# Patient Record
Sex: MALE | Race: WHITE
[De-identification: names, ages, dates, MRNs, and addresses within clinical notes are randomized per-mention and may not be internally consistent; named-entity substitution may affect disease eponyms.]

---

## 2018-01-02 ENCOUNTER — HOSPITAL ENCOUNTER (OUTPATIENT)
Dept: HOSPITAL 53 - M LRY | Age: 58
End: 2018-01-02
Attending: NURSE PRACTITIONER
Payer: COMMERCIAL

## 2018-01-02 DIAGNOSIS — M25.531: ICD-10-CM

## 2018-01-02 DIAGNOSIS — M19.041: ICD-10-CM

## 2018-01-02 DIAGNOSIS — M25.721: Primary | ICD-10-CM

## 2018-01-02 DIAGNOSIS — R60.0: ICD-10-CM

## 2018-01-02 DIAGNOSIS — M25.511: ICD-10-CM

## 2018-01-02 PROCEDURE — 73030 X-RAY EXAM OF SHOULDER: CPT

## 2018-05-13 ENCOUNTER — HOSPITAL ENCOUNTER (OUTPATIENT)
Dept: HOSPITAL 53 - M LRY | Age: 58
End: 2018-05-13
Attending: NURSE PRACTITIONER
Payer: COMMERCIAL
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
## 2018-05-13 DIAGNOSIS — S41.112A: Primary | ICD-10-CM
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
## 2018-05-13 PROCEDURE — 73090 X-RAY EXAM OF FOREARM: CPT

## 2018-06-25 ENCOUNTER — HOSPITAL ENCOUNTER (OUTPATIENT)
Dept: HOSPITAL 53 - M OPP | Age: 58
Discharge: HOME | End: 2018-06-25
Attending: INTERNAL MEDICINE
Payer: COMMERCIAL

## 2018-06-25 DIAGNOSIS — K22.70: ICD-10-CM

## 2018-06-25 DIAGNOSIS — K21.9: ICD-10-CM

## 2018-06-25 DIAGNOSIS — K44.9: ICD-10-CM

## 2018-06-25 DIAGNOSIS — M54.5: ICD-10-CM

## 2018-06-25 DIAGNOSIS — E78.00: ICD-10-CM

## 2018-06-25 DIAGNOSIS — G47.30: ICD-10-CM

## 2018-06-25 DIAGNOSIS — R12: ICD-10-CM

## 2018-06-25 DIAGNOSIS — Z80.3: ICD-10-CM

## 2018-06-25 DIAGNOSIS — N40.1: ICD-10-CM

## 2018-06-25 DIAGNOSIS — F41.1: ICD-10-CM

## 2018-06-25 DIAGNOSIS — Z79.899: ICD-10-CM

## 2018-06-25 DIAGNOSIS — Z91.89: ICD-10-CM

## 2018-06-25 DIAGNOSIS — K22.8: Primary | ICD-10-CM

## 2018-06-25 PROCEDURE — 43239 EGD BIOPSY SINGLE/MULTIPLE: CPT

## 2018-06-25 RX ADMIN — SODIUM CHLORIDE 1 MLS/HR: 9 INJECTION, SOLUTION INTRAVENOUS at 08:00

## 2018-10-01 ENCOUNTER — HOSPITAL ENCOUNTER (EMERGENCY)
Dept: HOSPITAL 53 - M ED | Age: 58
Discharge: LEFT BEFORE DISCHARGE | End: 2018-10-01
Payer: COMMERCIAL

## 2018-10-01 DIAGNOSIS — N40.0: ICD-10-CM

## 2018-10-01 DIAGNOSIS — Z53.21: ICD-10-CM

## 2018-10-01 DIAGNOSIS — Z91.013: ICD-10-CM

## 2018-10-01 DIAGNOSIS — K21.9: ICD-10-CM

## 2018-10-01 DIAGNOSIS — Z79.899: ICD-10-CM

## 2018-10-01 DIAGNOSIS — I10: ICD-10-CM

## 2018-10-01 DIAGNOSIS — I45.10: ICD-10-CM

## 2018-10-01 DIAGNOSIS — R42: Primary | ICD-10-CM

## 2018-10-01 LAB
ALBUMIN/GLOBULIN RATIO: 1.26 (ref 1–1.93)
ALBUMIN: 4.3 GM/DL (ref 3.2–5.2)
ALKALINE PHOSPHATASE: 79 U/L (ref 45–117)
ALT SERPL W P-5'-P-CCNC: 37 U/L (ref 12–78)
ANION GAP: 7 MEQ/L (ref 8–16)
AST SERPL-CCNC: 21 U/L (ref 7–37)
BASO #: 0 10^3/UL (ref 0–0.2)
BASO %: 0.4 % (ref 0–1)
BILIRUB CONJ SERPL-MCNC: 0.1 MG/DL (ref 0–0.2)
BILIRUBIN,TOTAL: 0.3 MG/DL (ref 0.2–1)
BLOOD UREA NITROGEN: 11 MG/DL (ref 7–18)
CALCIUM LEVEL: 9.6 MG/DL (ref 8.5–10.1)
CARBON DIOXIDE LEVEL: 25 MEQ/L (ref 21–32)
CHLORIDE LEVEL: 109 MEQ/L (ref 98–107)
CK MB CFR.DF SERPL CALC: 0.89
CK SERPL-CCNC: 293 U/L (ref 39–308)
CK-MB VALUE MASS: 3 NG/ML (ref ?–3.6)
CREATININE FOR GFR: 1 MG/DL (ref 0.7–1.3)
EOS #: 0.1 10^3/UL (ref 0–0.5)
EOSINOPHIL NFR BLD AUTO: 1.5 % (ref 0–3)
GFR SERPL CREATININE-BSD FRML MDRD: > 60 ML/MIN/{1.73_M2} (ref 56–?)
GLUCOSE, FASTING: 88 MG/DL (ref 70–100)
HEMATOCRIT: 41.9 % (ref 42–52)
HEMOGLOBIN: 14.6 G/DL (ref 13.5–17.5)
IMMATURE GRANULOCYTE %: 0.3 % (ref 0–3)
LYMPH #: 2.1 10^3/UL (ref 1.5–4.5)
LYMPH %: 29.7 % (ref 24–44)
MEAN CORPUSCULAR HEMOGLOBIN: 32.2 PG (ref 27–33)
MEAN CORPUSCULAR HGB CONC: 34.8 G/DL (ref 32–36.5)
MEAN CORPUSCULAR VOLUME: 92.5 FL (ref 80–96)
MONO #: 0.6 10^3/UL (ref 0–0.8)
MONO %: 8.3 % (ref 0–5)
NEUTROPHILS #: 4.3 10^3/UL (ref 1.8–7.7)
NEUTROPHILS %: 59.8 % (ref 36–66)
NRBC BLD AUTO-RTO: 0 % (ref 0–0)
PLATELET COUNT, AUTOMATED: 221 10^3/UL (ref 150–450)
POTASSIUM SERUM: 4 MEQ/L (ref 3.5–5.1)
RED BLOOD COUNT: 4.53 10^6/UL (ref 4.3–6.1)
RED CELL DISTRIBUTION WIDTH: 12.6 % (ref 11.5–14.5)
SODIUM LEVEL: 141 MEQ/L (ref 136–145)
THYROID STIMULATING HORMONE: 1 UIU/ML (ref 0.36–3.74)
TOTAL PROTEIN: 7.7 GM/DL (ref 6.4–8.2)
TROPONIN I: < 0.02 NG/ML (ref ?–0.1)
WHITE BLOOD COUNT: 7.2 10^3/UL (ref 4–10)

## 2018-10-01 PROCEDURE — 70450 CT HEAD/BRAIN W/O DYE: CPT

## 2018-10-01 RX ADMIN — MECLIZINE HYDROCHLORIDE 1 MG: 25 TABLET ORAL at 14:59

## 2018-11-02 ENCOUNTER — HOSPITAL ENCOUNTER (OUTPATIENT)
Dept: HOSPITAL 53 - M RAD | Age: 58
End: 2018-11-02
Attending: INTERNAL MEDICINE
Payer: COMMERCIAL

## 2018-11-02 DIAGNOSIS — I10: Primary | ICD-10-CM

## 2019-08-06 ENCOUNTER — HOSPITAL ENCOUNTER (OUTPATIENT)
Dept: HOSPITAL 53 - M PAIN | Age: 59
End: 2019-08-06
Attending: ANESTHESIOLOGY
Payer: COMMERCIAL

## 2019-08-06 DIAGNOSIS — R29.90: Primary | ICD-10-CM

## 2019-08-06 DIAGNOSIS — J30.2: ICD-10-CM

## 2019-08-06 DIAGNOSIS — Z91.018: ICD-10-CM

## 2019-08-06 DIAGNOSIS — I10: ICD-10-CM

## 2019-08-06 DIAGNOSIS — Z79.899: ICD-10-CM

## 2019-08-13 ENCOUNTER — HOSPITAL ENCOUNTER (OUTPATIENT)
Dept: HOSPITAL 53 - M PAIN | Age: 59
End: 2019-08-13
Attending: ANESTHESIOLOGY
Payer: COMMERCIAL

## 2019-08-13 DIAGNOSIS — Z79.82: ICD-10-CM

## 2019-08-13 DIAGNOSIS — Z79.899: ICD-10-CM

## 2019-08-13 DIAGNOSIS — Z91.018: ICD-10-CM

## 2019-08-13 DIAGNOSIS — Z01.89: Primary | ICD-10-CM

## 2019-08-13 DIAGNOSIS — J30.2: ICD-10-CM

## 2019-08-13 LAB
APPEARANCE CSF: CLEAR
COLOR CSF: COLORLESS
GLUCOSE CSF-MCNC: 46 MG/DL (ref 40–75)
PROT CSF-MCNC: 44 MG/DL (ref 15–45)
TUBE # CSF: (no result)

## 2019-08-13 PROCEDURE — 87070 CULTURE OTHR SPECIMN AEROBIC: CPT

## 2019-08-13 PROCEDURE — 99152 MOD SED SAME PHYS/QHP 5/>YRS: CPT

## 2019-08-13 PROCEDURE — 87102 FUNGUS ISOLATION CULTURE: CPT

## 2019-08-13 PROCEDURE — 87205 SMEAR GRAM STAIN: CPT

## 2019-08-13 PROCEDURE — 82784 ASSAY IGA/IGD/IGG/IGM EACH: CPT

## 2019-08-13 PROCEDURE — 83916 OLIGOCLONAL BANDS: CPT

## 2019-08-13 PROCEDURE — 36415 COLL VENOUS BLD VENIPUNCTURE: CPT

## 2019-08-13 PROCEDURE — 88108 CYTOPATH CONCENTRATE TECH: CPT

## 2019-08-13 PROCEDURE — 84157 ASSAY OF PROTEIN OTHER: CPT

## 2019-08-13 PROCEDURE — 62270 DX LMBR SPI PNXR: CPT

## 2019-08-13 PROCEDURE — 99153 MOD SED SAME PHYS/QHP EA: CPT

## 2019-08-13 PROCEDURE — 88313 SPECIAL STAINS GROUP 2: CPT

## 2019-08-13 PROCEDURE — 89050 BODY FLUID CELL COUNT: CPT

## 2019-08-13 PROCEDURE — 87483 CNS DNA AMP PROBE TYPE 12-25: CPT

## 2019-08-13 PROCEDURE — 87252 VIRUS INOCULATION TISSUE: CPT

## 2019-08-13 PROCEDURE — 82945 GLUCOSE OTHER FLUID: CPT

## 2019-08-14 NOTE — ECWPNPC
PATIENT NAME: ORIN GILMORE

: 1960

GENDER: MALE

MRN: [id number]

VISIT DATE: 2019

DISCHARGE DATE: 19 1726

VISIT LOCKED DATE TIME: 

PHYSICIAN: JAYASHREE OBRIEN MD

RESOURCE: JAYASHREE OBRIEN MD

 

           

           

REASON FOR APPOINTMENT

           

          1. PRE-OP SPINAL TAP

           

HISTORY OF PRESENT ILLNESS

           

      HISTORY OF PRESENT ILLNESS:

      PAIN

           

           

          THE PATIENT DESCRIBES THE PAIN...

           

           58 YEAR OLD MALE PATIENT WITH A HISTORY OF NEUROLOGICAL CHANGES.

          THE PATIENT HAD A MRI DONE THAT SHOWED CHANGES OVER THE BRAIN

          THAT MAY INDICATE MULTIPLE SCLEROSIS. THE PATIENT WAS REFERRED TO

          US BY DR. PELAYO FOR A SPINAL TAP AND IS HERE TODAY FOR A PRE

          SEDATION PHYSICAL. PATIENT DENIES UNEXPLAINABLE WEIGHT LOSS,

          FEVER, CHILLS, NEW CHANGES ON HIS URINARY OR BOWEL CONTROL.

           

      FALL RISK SCREENING:

      SCREENING

           

           

          :NO FALLS REPORTED IN THE LAST YEAR

           

CURRENT MEDICATIONS

           

          TAKING NEXIUM 20 MG CAPSULE DELAYED RELEASE 1 CAPSULE ORALLY ONCE

          A DAY

          TAKING LISINOPRIL 20 20 MG TABLET ORAL

          TAKING CLARITIN 10 MG TABLET 1 TABLET ORALLY ONCE A DAY

          TAKING FISH OIL 1000 MG CAPSULE 1 CAPSULE ORALLY ONCE A DAY

          TAKING IBUPROFEN 800 MG TABLET 1 TABLET ORALLY FOUR TIMES DAILY

          TAKE WITH FOOD

          TAKING AMLODIPINE BESYLATE 5 MG TABLET 1 TABLET ORALLY ONCE A DAY

          TAKING HYDROCHLOROTHIAZIDE 25 MG TABLET 1 TABLET IN THE MORNING

          ORALLY ONCE A DAY

          DISCONTINUED FINASTERIDE 5 MG TABLET 1 CAP ORALLY DAILY

          DISCONTINUED KEFLEX 500 MG CAPSULE 1 CAPSULE ORALLY EVERY 12 HRS

          MEDICATION LIST REVIEWED AND RECONCILED WITH THE PATIENT

           

PAST MEDICAL HISTORY

           

          HTN

          GERD

          HIGH CHOLESTEROL

           

ALLERGIES

           

          HAY FEVER: SNEEZING - ALLERGY

          CAT FISH: ANAPHYLAXIS - ALLERGY

           

SURGICAL HISTORY

           

          R KNEE REPAIR 

          LEFT KNEE REPAIR 

          VOCAL CORD NODULES REMOVED 

          THUMB TRIGGER FINGER REPAIR 

           

FAMILY HISTORY

           

          FATHER: , DIAGNOSED WITH HYPERTENSION, HEART DISEASE

          MOTHER: 

          3 BROTHER(S) - HEALTHY. 1 SON(S) , 1 DAUGHTER(S) .

          MOTHER-SCLERODERMADAUGHTER-LUPUS.

           

SOCIAL HISTORY

           

          GENERAL:

           

          TOBACCO USE

          ARE YOU A:NONSMOKER

           

           

          PAIN CLINIC PFS, CLERGY, PUBLIC HEALTH REFERRALS

          HAS THE PATIENT BEEN EDUCATED REGARDING HIS/HER PLAN OF CARE?YES

          HAS THE PATIENT BEEN EDUCATED REGARDING PAIN, THE RISK FOR PAIN,

          THE IMPORTANCE OF EFFECTIVE PAIN MANAGEMENT, AND THE PAIN

          ASSESSMENT PROCESS?YES

           

           

          HIV / HEP-C SCREENING

          HIV TEST OFFERED TO PATIENT:YES

          DATE OFFERED:2018

          TEST ACCEPTED:NO

          HEP-C TEST OFFERED TO PATIENT:YES

          DATE OFFERED:2018

          REASON:PATIENT DECLINED

          TEST ACCEPTED:NO

          REASON:PATIENT DECLINED

           

           

          ADVANCE DIRECTIVE

          ADVANCE DIRECTIVE DISCUSSED WITH PATIENT:YES STEPHEN WIFE [phone]/ [phone]

           

           

          [employer]

          [id number]

           

           

          LANGUAGE

          LANGUAGES SPOKEN:ENGLISH

           

           

          LEARNING BARRIERS / SPECIAL NEEDS

          BARRIERS TO LEARNING?NO

          HEARING IMPAIRED?NO

          VISION IMPAIRED?YES

          :CORRECTIVE LENSES READING GLASSES

          COGNITIVELY IMPAIRED?NO

          READINESS TO LEARN?YES

          LEARNING PREFERENCES?NO

          LEARNING CAPABILITIES PRESENT?YES

          EMOTIONAL BARRIERS?NO

          SPECIAL DEVICES?NO

           NEEDED?NO

           

HOSPITALIZATION/MAJOR DIAGNOSTIC PROCEDURE

           

          SURGERIES

           

REVIEW OF SYSTEMS

           

      REVIEWED BY:

           

          PROVIDER:    JAYASHREE OBRIEN MD .

           

      CONSTITUTIONAL:

           

          ANY CHANGE IN YOUR MEDICAL CONDITION?    NO . CHILLS    NO .

          FEVER    NO .

           

      INFECTION:

           

          DO YOU HAVE NEW INFECTIONS?    NO . DO YOU HAVE HISTORY OF MRSA? 

            NO .

           

      MUSCULOSKELETAL:

           

          ANY NEW PATTERNS OF PAIN OR NUMBNESS?    YES, WHOLE BODY NUMB

          SEVERAL TIMES .

           

      GASTROENTEROLOGY:

           

          ANY NEW CHANGE IN BOWEL CONTROL?    NO .

           

      GENITOURINARY:

           

          ANY NEW CHANGE IN BLADDER CONTROL?    NO . IS THERE A CHANCE YOU

          COULD BE PREGNANT?    NO .

           

      HEMATOLOGY/LYMPH:

           

          DO YOU TAKE ANY BLOOD THINNERS? (FOR EXAMPLE- COUMADIN, PLAVIX,

          AGGRENOX, PLATEL, PRADAXA, OR XARELTO)    NO . WHEN WAS YOUR LAST

          DOSE?    DATE: TIME:  .

           

      NEUROLOGY:

           

          HAVE YOU FALLEN IN THE PAST 12 MONTHS?    NO . ANY NEW EXTREMITY

          NUMBNESS OR WEAKNESS?    NO .

           

      CARDIOLOGY:

           

          DO YOU HAVE A PACEMAKER OR DEFIBRILLATOR?    NO .

           

      RESPIRATORY:

           

          HAVE YOU BEEN SICK IN THE PAST WEEK?    NO . FEVER    NO . FLU

          LIKE SYMPTOMS?    NO . COUGH    NO .

           

      INTEGUMENTARY:

           

          DO YOU HAVE ANY RASHES OR OPEN SORES?    NO .

           

      ALLERGIC/IMMUNO:

           

          ARE YOU ALLERGIC TO IV DYE?    NO . ANY NEW ALLERGIES?    NO .

           

      PSYCHIATRIC:

           

          DO YOU HAVE THOUGHTS OF HURTING YOURSELF OR SOMEONE ELSE?    NO .

          ARE YOU ABUSED, NEGLECTED, OR IN AN UNSAFE ENVIRONMENT?    NO .

           

      ENDOCRINOLOGY:

           

          ARE YOU DIABETIC?    NO .

           

      OTHER:

           

          DO YOU NEED ANY PRESCRIPTIONS?    NO . IF YES, PLEASE LIST:   

          ____ . ANY NEW PROBLEMS WITH YOUR MEDICATIONS?    NO . WHEN DID

          YOU LAST EAT?    ____ . WHEN DID YOU LAST DRINK?    ____ . WHAT

          DID YOU LAST DRINK?    ____ . NAME OF PERSON DRIVING YOU HOME?   

          ____ . DO YOU HAVE ANY OTHER QUESTIONS OR CONCERNS    NO .

           

VITAL SIGNS

           

           LBS, HT 71 IN, BMI 29.15 INDEX, /71 MM HG, HR 96

          /MIN, RR 18 /MIN, TEMP 98.6 F, OXYGEN SAT % 98%, NA INITIALS SC

          15:32, REVIEWED BY: EM.

           

EXAMINATION

           

      GENERAL EXAMINATION: PATIENT IS ALERT O X 3 AND

          COOPERATIVE. LUNGS CLEAR, TO AUSCULTATION. HEART: NO MURMURS OR

          GALLOPS; FACIAL CRANIAL NERVES ARE GROSSLY NORMAL. GOOD SYMMETRY

          OF FACIAL MUSCLE MOVEMENT. NORMAL VISUAL FIELDS. MILD TENDERNESS

          IN THE LOW BACK. PATIENT NOTES FROM NEUROLOGIST MENTIONING

          POSSIBILITY OF MULTIPLE SCLEROSIS WITH SPINAL TAP ORDER FROM DR. PELAYO.

           

ASSESSMENTS

           

          NEUROLOGICAL SYMPTOMS - R29.90 (PRIMARY)

           

          RULE OUT MULTIPLE SCLEROSIS.

           

TREATMENT

           

      NEUROLOGICAL SYMPTOMS

          CLINICAL NOTES: WE DISCUSSED SEVERAL ISSUES WITH MR. GILMORE'S

          PAIN MANAGEMENT CASE. THE PATIENT WILL COME IN FOR A SPINAL TAP

          IN A FEW WEEKS. WE DISCUSSED THE BENEFITS AND RISKS OF THE

          PROCEDURE AND THE PATIENT WOULD LIKE TO PROCEED. INSTRUCTIONS

          WERE GIVEN, QUESTIONS WERE ANSWERED, PATIENT REPORTS

          UNDERSTANDING AND AGREES WITH THE PLAN. I, DIONE KELLER,

          DOCUMENTED THE ABOVE INFORMATION ACTING AS A SCRIBE FOR DR. OBRIEN. I HAVE REVIEWED THE ABOVE DOCUMENT, WRITTEN BY DIONE ZARCO AND I VERIFY THAT IT IS ACCURATE..

           

PREVENTIVE MEDICINE

           

      PAIN CLINIC TEACHING:

           

          PROCEDURE TEACHING   SPINAL TAP INFORMATION PRITNED, REVIEWED AND

          GIVEN TO PATIENT 1627 19 [facility].

           

PROCEDURE CODES

           

           ESTABILISHED PATIENT [facility] FACILITY CHARGE

           

           CURRENT MEDS W/DOSAGES DOCUMENTED

           

           PAIN ASSESS POS TOOL F/U PLAN DOC

           

DISPOSITION & COMMUNICATION

           

FOLLOW UP

           

          3 WEEKS

           

 

ELECTRONICALLY SIGNED BY JAYASHREE OBRIEN MD, MD ON

          2019 AT 01:52 PM EDT

           

           

           

 

DISCLAIMER :

THIS IS A VISIT SUMMARY EXTRACTED FROM THE [facility] CHART.

IT IS NOT A COPY OF THE [facility] PROGRESS NOTE.

JAYLOND

## 2019-08-21 NOTE — ECWPNPC
PATIENT NAME: ORIN GILMORE

: 1960

GENDER: MALE

MRN: [id number]

VISIT DATE: 2019

DISCHARGE DATE: 19 1655

VISIT LOCKED DATE TIME: 

PHYSICIAN: JAYASHREE OBRIEN MD

RESOURCE: JAYASHREE OBRIEN MD

 

           

           

REASON FOR APPOINTMENT

           

          1. SPINAL TAP-- R/O MS

           

HISTORY OF PRESENT ILLNESS

           

      HISTORY OF PRESENT ILLNESS:

      PAIN

           

           

          THE PATIENT DESCRIBES THE PAIN...

           

      FALL RISK SCREENING:

      SCREENING

           

           

          :NO FALLS REPORTED IN THE LAST YEAR

           

CURRENT MEDICATIONS

           

          TAKING LISINOPRIL 20 20 MG TABLET 1 TAB ORAL , NOTES: 0800

          TAKING CLARITIN 10 MG TABLET 1 TABLET ORALLY ONCE A DAY, NOTES: 1

          WEEK AGO

          TAKING FISH OIL 1000 MG CAPSULE 1 CAPSULE ORALLY ONCE A DAY,

          NOTES: 0800

          TAKING IBUPROFEN 800 MG TABLET 1 TABLET ORALLY FOUR TIMES DAILY

          TAKE WITH FOOD, NOTES: 2 WEEKS

          TAKING AMLODIPINE BESYLATE 5 MG TABLET 1 TABLET ORALLY ONCE A

          DAY, NOTES: 0800

          TAKING HYDROCHLOROTHIAZIDE 25 MG TABLET 1 TABLET IN THE MORNING

          ORALLY ONCE A DAY, NOTES: 0800

          TAKING OMEPRAZOLE 20 MG CAPSULE DELAYED RELEASE 1 CAPSULE ORALLY

          ONCE A DAY, NOTES: 0800

          TAKING ASPIRIN 81 81 MG TABLET DELAYED RELEASE 1 TABLET ORALLY

          ONCE A DAY, NOTES: 0800

          TAKING CRESTOR 10 MG TABLET 2 TABLET ORALLY ONCE A DAY, NOTES:

          19@2200

          TAKING MELATONIN 3 MG TABLET 1 TABLET AT BEDTIME AS NEEDED WITH

          FOOD ORALLY ONCE A DAY, NOTES: 19@2200

          TAKING MULTI FOR HIM - TABLET AS DIRECTED ORALLY , NOTES: 0800

          DISCONTINUED NEXIUM 20 MG CAPSULE DELAYED RELEASE 1 CAPSULE

          ORALLY ONCE A DAY, NOTES: 0800

          MEDICATION LIST REVIEWED AND RECONCILED WITH THE PATIENT

           

PAST MEDICAL HISTORY

           

          HTN

          GERD

          HIGH CHOLESTEROL

           

ALLERGIES

           

          HAY FEVER: SNEEZING - ALLERGY

          CAT FISH: ANAPHYLAXIS - ALLERGY

           

SURGICAL HISTORY

           

          R KNEE REPAIR 

          LEFT KNEE REPAIR 

          VOCAL CORD NODULES REMOVED 

          THUMB TRIGGER FINGER REPAIR 

           

FAMILY HISTORY

           

          FATHER: , DIAGNOSED WITH HYPERTENSION, HEART DISEASE

          MOTHER: 

          3 BROTHER(S) - HEALTHY. 1 SON(S) , 1 DAUGHTER(S) .

          MOTHER-SCLERODERMADAUGHTER-LUPUS.

           

SOCIAL HISTORY

           

          GENERAL:

           

          TOBACCO USE

          ARE YOU A:NONSMOKER

           

           

          HIV / HEP-C SCREENING

          HIV TEST OFFERED TO PATIENT:YES

          DATE OFFERED:2018

          TEST ACCEPTED:NO

          HEP-C TEST OFFERED TO PATIENT:YES

          DATE OFFERED:2018

          REASON:PATIENT DECLINED

          TEST ACCEPTED:NO

          REASON:PATIENT DECLINED

           

           

          OTHERS AT HOME: SPOUSE.

           

           

          DIET: REGULAR.

           

           

          LANGUAGE

          LANGUAGES SPOKEN:ENGLISH

           

           

          EXERCISE: BIKES, WALKS.

           

           

          LEARNING BARRIERS / SPECIAL NEEDS

          BARRIERS TO LEARNING?NO

          HEARING IMPAIRED?NO

          VISION IMPAIRED?YES

          COGNITIVELY IMPAIRED?NO

          :CORRECTIVE LENSES READING GLASSES

          READINESS TO LEARN?YES

          LEARNING PREFERENCES?NO

          LEARNING CAPABILITIES PRESENT?YES

          EMOTIONAL BARRIERS?NO

          SPECIAL DEVICES?NO

           NEEDED?NO

           

           

          PAIN CLINIC PFS, CLERGY, PUBLIC HEALTH REFERRALS

          HAS THE PATIENT BEEN EDUCATED REGARDING HIS/HER PLAN OF CARE?YES

          HAS THE PATIENT BEEN EDUCATED REGARDING PAIN, THE RISK FOR PAIN,

          THE IMPORTANCE OF EFFECTIVE PAIN MANAGEMENT, AND THE PAIN

          ASSESSMENT PROCESS?YES

           

           

          LATEX QUESTIONNAIRE

          LATEX ALLERGY : HAVE YOU EVER DEVELOPED ANY TYPE OF REACTION

          AFTER HANDLING LATEX PRODUCTS SUCH AS RUBBER GLOVES, CONDOMS,

          DIAPHRAGMS, BALLOONS, SOCKS, OR UNDERWEAR?NO

          LATEX ALLERGY : HAVE YOU EVER DEVELOPED ANY TYPE OF REACTION

          DURING OR AFTER DENTAL APPOINTMENT, VAGINAL/RECTAL EXAMINATION,

          SURGICAL PROCEDURE, OR ANY OTHER EXPOSURE?NO

          LATEX RISK : HAVE YOU EVER HAD ANY DIFFICULTY BREATHING OR HIVES

          AFTER EATING OR HANDLING ANY FRUITS, OR VEGETABLES; SUCH AS KIWI,

          BANANAS, STONE FRUITS, OR CHESTNUTSNO

          LATEX RISK : DO YOU HAVE A PREVIOUS PERSONAL HISTORY OF MORE THAN

          NINE SURGERIES, SPINA BIFIDA, OR REPEATED CATHERIZATIONS? NO

          LATEX RISK : ARE YOU FREQUENTLY EXPOSED TO LATEX PRODUCTS IN YOUR

          OCCUPATION?NO

          DATE ASKED : 2019

           

           

          ADVANCE DIRECTIVE

          ADVANCE DIRECTIVE DISCUSSED WITH PATIENT:YES STEPHEN WIFE Balaji [phone]/ [phone]

           

           

          [employer]

          [id number]

           

           

          MARITAL STATUS: .

           

           

          OCCUPATION: RETIRED.

           

HOSPITALIZATION/MAJOR DIAGNOSTIC PROCEDURE

           

          SURGERIES

           

REVIEW OF SYSTEMS

           

      REVIEWED BY:

           

          PROVIDER:    _____ .

           

      CONSTITUTIONAL:

           

          ANY CHANGE IN YOUR MEDICAL CONDITION?    NO . CHILLS    NO .

          FEVER    NO .

           

      INFECTION:

           

          DO YOU HAVE NEW INFECTIONS?    NO . DO YOU HAVE HISTORY OF MRSA? 

            NO .

           

      MUSCULOSKELETAL:

           

          ANY NEW PATTERNS OF PAIN OR NUMBNESS?    NO .

           

      GASTROENTEROLOGY:

           

          ANY NEW CHANGE IN BOWEL CONTROL?    NO .

           

      GENITOURINARY:

           

          ANY NEW CHANGE IN BLADDER CONTROL?    NO . IS THERE A CHANCE YOU

          COULD BE PREGNANT?    NO .

           

      HEMATOLOGY/LYMPH:

           

          DO YOU TAKE ANY BLOOD THINNERS? (FOR EXAMPLE- COUMADIN, PLAVIX,

          AGGRENOX, PLATEL, PRADAXA, OR XARELTO)    NO . WHEN WAS YOUR LAST

          DOSE?    DATE: TIME:  .

           

      NEUROLOGY:

           

          HAVE YOU FALLEN IN THE PAST 12 MONTHS?    NO . ANY NEW EXTREMITY

          NUMBNESS OR WEAKNESS?    NO .

           

      CARDIOLOGY:

           

          DO YOU HAVE A PACEMAKER OR DEFIBRILLATOR?    NO .

           

      RESPIRATORY:

           

          HAVE YOU BEEN SICK IN THE PAST WEEK?    NO . FEVER    NO . FLU

          LIKE SYMPTOMS?    NO . COUGH    NO .

           

      INTEGUMENTARY:

           

          DO YOU HAVE ANY RASHES OR OPEN SORES?    NO .

           

      ALLERGIC/IMMUNO:

           

          ARE YOU ALLERGIC TO IV DYE?    NO . ANY NEW ALLERGIES?    NO .

           

      PSYCHIATRIC:

           

          DO YOU HAVE THOUGHTS OF HURTING YOURSELF OR SOMEONE ELSE?    NO .

          ARE YOU ABUSED, NEGLECTED, OR IN AN UNSAFE ENVIRONMENT?    NO .

           

      ENDOCRINOLOGY:

           

          ARE YOU DIABETIC?    NO .

           

      OTHER:

           

          DO YOU NEED ANY PRESCRIPTIONS?    NO . IF YES, PLEASE LIST:   

          ____ . ANY NEW PROBLEMS WITH YOUR MEDICATIONS?    NO . WHEN DID

          YOU LAST EAT?    ____19 . WHEN DID YOU LAST DRINK?   

          ____1100 . WHAT DID YOU LAST DRINK?    ____WATER . NAME OF PERSON

          DRIVING YOU HOME?    ____JOANNE . DO YOU HAVE ANY OTHER QUESTIONS

          OR CONCERNS    OPENING PRESSURE WAS 22 CC. FENTANYL 50MCG IV

          GIVEN AT 1606 AND 1613. VERSED 1MG IV GIVEN AT 1606 AND 1613 ALL

          BY ODALIS YANCEY RN BSN DURING SPINAL TAP .

           

VITAL SIGNS

           

          .6 LBS, HT 71 IN, BMI 29.51 INDEX, /75 MM HG, HR 82

          /MIN, RR 18 /MIN, TEMP 98.2 F, OXYGEN SAT % 97%, SAFE IN ENV?

          (Y/N) YES, NA INITIALS AW 1257, REVIEWED BY: BLANCA.

           

ASSESSMENTS

           

          ENCOUNTER FOR LUMBAR PUNCTURE - Z01.89 (PRIMARY)

           

          MS PROTOCOL.

           

TREATMENT

           

      OTHERS

          CLINICAL NOTES: SPINAL TAP WITH IV SEDATION - PLEASE SEE

          [employer]. .

           

PROCEDURE CODES

           

          33045 SPINAL FLUID TAP DIAGNOSTIC

           

          72509 MOD SED SAME PHYS/QHP 5/>YRS

           

          49132 MOD SED SAME PHYS/QHP EA

           

DISPOSITION & COMMUNICATION

           

FOLLOW UP

           

          F/UP WITH NEUROLOGIST/ CALL AS NEEDED.

           

 

ELECTRONICALLY SIGNED BY JAYASHREE OBRIEN MD, MD ON

          2019 AT 01:53 PM EDT

           

           

           

 

DISCLAIMER :

THIS IS A VISIT SUMMARY EXTRACTED FROM THE [facility] CHART.

IT IS NOT A COPY OF THE [facility] PROGRESS NOTE.

MTDD

## 2022-03-09 ENCOUNTER — HOSPITAL ENCOUNTER (OUTPATIENT)
Dept: HOSPITAL 53 - M LABSMTC | Age: 62
End: 2022-03-09
Attending: ANESTHESIOLOGY
Payer: OTHER GOVERNMENT

## 2022-03-09 DIAGNOSIS — Z11.52: ICD-10-CM

## 2022-03-09 DIAGNOSIS — Z01.818: Primary | ICD-10-CM

## 2022-03-14 ENCOUNTER — HOSPITAL ENCOUNTER (OUTPATIENT)
Dept: HOSPITAL 53 - M OPP | Age: 62
Discharge: HOME | End: 2022-03-14
Attending: INTERNAL MEDICINE
Payer: OTHER GOVERNMENT

## 2022-03-14 VITALS — BODY MASS INDEX: 30.44 KG/M2 | WEIGHT: 217.4 LBS | HEIGHT: 71 IN

## 2022-03-14 VITALS — DIASTOLIC BLOOD PRESSURE: 63 MMHG | SYSTOLIC BLOOD PRESSURE: 113 MMHG

## 2022-03-14 DIAGNOSIS — Z12.11: Primary | ICD-10-CM

## 2022-03-14 DIAGNOSIS — Z86.010: ICD-10-CM

## 2022-03-14 DIAGNOSIS — K64.0: ICD-10-CM

## 2022-03-14 DIAGNOSIS — Z79.82: ICD-10-CM

## 2022-03-14 DIAGNOSIS — K29.70: ICD-10-CM

## 2022-03-14 DIAGNOSIS — D12.6: ICD-10-CM

## 2022-03-14 DIAGNOSIS — K22.89: ICD-10-CM

## 2022-03-14 DIAGNOSIS — Z91.013: ICD-10-CM

## 2022-03-14 DIAGNOSIS — R12: ICD-10-CM

## 2022-03-14 DIAGNOSIS — Z79.899: ICD-10-CM

## 2022-03-14 PROCEDURE — 43239 EGD BIOPSY SINGLE/MULTIPLE: CPT

## 2022-03-14 PROCEDURE — 88305 TISSUE EXAM BY PATHOLOGIST: CPT

## 2022-03-14 PROCEDURE — 45380 COLONOSCOPY AND BIOPSY: CPT

## 2025-02-05 ENCOUNTER — HOSPITAL ENCOUNTER (OUTPATIENT)
Dept: HOSPITAL 53 - M OPP | Age: 65
Discharge: HOME | End: 2025-02-05
Attending: INTERNAL MEDICINE
Payer: MEDICARE

## 2025-02-05 VITALS — HEIGHT: 71 IN | BODY MASS INDEX: 29.71 KG/M2 | WEIGHT: 212.2 LBS

## 2025-02-05 VITALS — DIASTOLIC BLOOD PRESSURE: 73 MMHG | SYSTOLIC BLOOD PRESSURE: 121 MMHG | OXYGEN SATURATION: 97 % | TEMPERATURE: 97.9 F

## 2025-02-05 DIAGNOSIS — G47.30: ICD-10-CM

## 2025-02-05 DIAGNOSIS — Z79.899: ICD-10-CM

## 2025-02-05 DIAGNOSIS — K63.5: Primary | ICD-10-CM

## 2025-02-05 DIAGNOSIS — I10: ICD-10-CM

## 2025-02-05 DIAGNOSIS — K64.0: ICD-10-CM

## 2025-02-05 DIAGNOSIS — K44.9: ICD-10-CM

## 2025-02-05 DIAGNOSIS — K22.70: ICD-10-CM

## 2025-02-05 DIAGNOSIS — Z86.0100: ICD-10-CM

## 2025-02-05 DIAGNOSIS — K21.9: ICD-10-CM

## 2025-02-05 DIAGNOSIS — Z87.891: ICD-10-CM

## 2025-02-05 DIAGNOSIS — E78.00: ICD-10-CM

## 2025-02-05 DIAGNOSIS — Z91.013: ICD-10-CM

## 2025-02-05 DIAGNOSIS — Z91.048: ICD-10-CM

## 2025-02-05 DIAGNOSIS — K57.30: ICD-10-CM

## 2025-02-05 DIAGNOSIS — K31.A19: ICD-10-CM

## 2025-02-05 DIAGNOSIS — K22.89: ICD-10-CM

## 2025-02-05 DIAGNOSIS — F32.A: ICD-10-CM

## 2025-02-05 PROCEDURE — 88305 TISSUE EXAM BY PATHOLOGIST: CPT

## 2025-02-05 PROCEDURE — 45385 COLONOSCOPY W/LESION REMOVAL: CPT

## 2025-02-05 PROCEDURE — 43239 EGD BIOPSY SINGLE/MULTIPLE: CPT

## 2025-03-18 ENCOUNTER — HOSPITAL ENCOUNTER (OUTPATIENT)
Dept: HOSPITAL 53 - M ONCR | Age: 65
End: 2025-03-18
Attending: RADIOLOGY
Payer: MEDICARE

## 2025-03-18 DIAGNOSIS — Z80.3: ICD-10-CM

## 2025-03-18 DIAGNOSIS — C61: Primary | ICD-10-CM

## 2025-03-18 DIAGNOSIS — Z80.42: ICD-10-CM

## 2025-03-18 DIAGNOSIS — Z79.899: ICD-10-CM

## 2025-03-18 DIAGNOSIS — Z91.013: ICD-10-CM

## 2025-03-18 DIAGNOSIS — Z91.09: ICD-10-CM

## 2025-03-18 DIAGNOSIS — Z79.82: ICD-10-CM

## 2025-03-26 ENCOUNTER — HOSPITAL ENCOUNTER (OUTPATIENT)
Dept: HOSPITAL 53 - M ONCR | Age: 65
LOS: 5 days | End: 2025-03-31
Attending: RADIOLOGY
Payer: MEDICARE

## 2025-03-26 DIAGNOSIS — Z51.0: Primary | ICD-10-CM

## 2025-03-26 DIAGNOSIS — C61: ICD-10-CM

## 2025-04-30 ENCOUNTER — HOSPITAL ENCOUNTER (OUTPATIENT)
Dept: HOSPITAL 53 - M ONCR | Age: 65
End: 2025-04-30
Attending: RADIOLOGY
Payer: MEDICARE

## 2025-04-30 DIAGNOSIS — C61: ICD-10-CM

## 2025-04-30 DIAGNOSIS — Z51.0: Primary | ICD-10-CM
